# Patient Record
Sex: MALE | Race: BLACK OR AFRICAN AMERICAN | NOT HISPANIC OR LATINO | Employment: FULL TIME | URBAN - METROPOLITAN AREA
[De-identification: names, ages, dates, MRNs, and addresses within clinical notes are randomized per-mention and may not be internally consistent; named-entity substitution may affect disease eponyms.]

---
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
## 2021-04-13 DIAGNOSIS — Z23 ENCOUNTER FOR IMMUNIZATION: ICD-10-CM

## 2022-03-25 ENCOUNTER — APPOINTMENT (OUTPATIENT)
Dept: RADIOLOGY | Facility: CLINIC | Age: 22
End: 2022-03-25
Payer: COMMERCIAL

## 2022-03-25 ENCOUNTER — OFFICE VISIT (OUTPATIENT)
Dept: OBGYN CLINIC | Facility: CLINIC | Age: 22
End: 2022-03-25
Payer: COMMERCIAL

## 2022-03-25 VITALS
BODY MASS INDEX: 24.38 KG/M2 | HEART RATE: 62 BPM | DIASTOLIC BLOOD PRESSURE: 71 MMHG | SYSTOLIC BLOOD PRESSURE: 109 MMHG | HEIGHT: 74 IN | WEIGHT: 190 LBS

## 2022-03-25 DIAGNOSIS — M25.561 CHRONIC PAIN OF BOTH KNEES: ICD-10-CM

## 2022-03-25 DIAGNOSIS — M76.52 PATELLAR TENDINITIS OF BOTH KNEES: Primary | ICD-10-CM

## 2022-03-25 DIAGNOSIS — M25.562 CHRONIC PAIN OF BOTH KNEES: ICD-10-CM

## 2022-03-25 DIAGNOSIS — M76.51 PATELLAR TENDINITIS OF BOTH KNEES: Primary | ICD-10-CM

## 2022-03-25 DIAGNOSIS — G89.29 CHRONIC PAIN OF BOTH KNEES: ICD-10-CM

## 2022-03-25 PROCEDURE — 99203 OFFICE O/P NEW LOW 30 MIN: CPT | Performed by: FAMILY MEDICINE

## 2022-03-25 PROCEDURE — 73564 X-RAY EXAM KNEE 4 OR MORE: CPT

## 2022-03-25 NOTE — PROGRESS NOTES
Assessment/Plan:  Assessment/Plan   Diagnoses and all orders for this visit:    Patellar tendinitis of both knees  -     XR knee 4+ vw right injury; Future  -     XR knee 4+ vw left injury; Future  -     Patellar strap      49-year-old active male from [facility] with bilateral knee pain more than 1 year duration  Discussed with patient physical exam, radiographs, impression and plan  X-rays of the knees are unremarkable for osseous abnormality  Physical exam both knees noted for prominence of tibial tuberosity  Physical noted for tenderness of the tibial tuberosity and patellar tendon both knees  He has full range of motion and strength both knees  There is no appreciable collateral laxity  There is negative patellar inhibition and grind  Lavell's testing is unremarkable  There is groin pain on the left with ELLIE maneuver  He has normal sensation both lower extremities  Clinical impression is that he is symptomatic from patellar tendinitis  I discussed regimen of supplements, topical anti-inflammatory, and strapping  I provided him patellar tendon straps to be worn during physical activity  He is to start taking tumeric at least 1000 mg daily and tart cherry at least 1000 mg daily  He is to apply topical diclofenac gel 3 to 4 times a day consistently for the next 10 days  He will follow up as needed  Subjective:   Patient ID: Jenae Goss is a 25 y o  male  Chief Complaint   Patient presents with    Right Knee - Pain    Left Knee - Pain       49-year-old active male from [facility] presents for evaluation of bilateral knee pain more than 1 year duration  He denies any particular trauma or inciting event  Pain described as gradual in onset, localized to the anterior aspect knees at the proximal tibia, nonradiating, worse with running and jumping activities, and improved resting  Symptoms were initially occurring rarely however have become more frequent    He reports onset of symptoms shortly after initiating running jumping activity  He still continue participating in [employer] basketball games however experiences pain  He sometimes takes Tylenol and applies ice to help with symptoms  He denies buckling or instability or numbness or tingling  Knee Pain  This is a chronic problem  The current episode started more than 1 year ago  The problem occurs daily  The problem has been waxing and waning  Associated symptoms include arthralgias  Pertinent negatives include no abdominal pain, chest pain, chills, fever, joint swelling, numbness, rash, sore throat or weakness  Exacerbated by: Running, jumping  He has tried rest, acetaminophen and ice for the symptoms  The treatment provided mild relief  The following portions of the patient's history were reviewed and updated as appropriate: He  has a past medical history of Known health problems: none  He  has no past surgical history on file  His family history is not on file  He  reports that he has never smoked  He has never used smokeless tobacco  He reports current alcohol use  He reports that he does not use drugs  He has No Known Allergies       Review of Systems   Constitutional: Negative for chills and fever  HENT: Negative for sore throat  Eyes: Negative for visual disturbance  Respiratory: Negative for shortness of breath  Cardiovascular: Negative for chest pain  Gastrointestinal: Negative for abdominal pain  Genitourinary: Negative for flank pain  Musculoskeletal: Positive for arthralgias  Negative for joint swelling  Skin: Negative for rash and wound  Neurological: Negative for weakness and numbness  Hematological: Does not bruise/bleed easily  Psychiatric/Behavioral: Negative for self-injury         Objective:  Vitals:    03/25/22 1330   BP: 109/71   Pulse: 62   Weight: 86 2 kg (190 lb)   Height: 6' 2" (1 88 m)     Right Ankle Exam     Tenderness   The patient is experiencing no tenderness  Swelling: none    Range of Motion   Dorsiflexion: normal   Plantar flexion: normal     Muscle Strength   Dorsiflexion:  5/5  Plantar flexion:  5/5      Left Ankle Exam     Tenderness   The patient is experiencing no tenderness  Swelling: none    Range of Motion   Dorsiflexion: normal   Plantar flexion: normal     Muscle Strength   Dorsiflexion:  5/5   Plantar flexion:  5/5       Right Knee Exam     Muscle Strength   The patient has normal right knee strength  Tenderness   The patient is experiencing tenderness in the patellar tendon (Tibial tuberosity)  Range of Motion   The patient has normal right knee ROM  Tests   Lavell:  Medial - negative Lateral - negative  Varus: negative Valgus: negative    Other   Swelling: none    Comments:  Negative patellar inhibition and grind      Left Knee Exam     Tenderness   The patient is experiencing tenderness in the patellar tendon (Tibial tuberosity)  Range of Motion   The patient has normal left knee ROM  Tests   Lavell:  Medial - negative Lateral - negative  Varus: negative Valgus: negative    Other   Swelling: none    Comments:  Negative patellar inhibition and grind      Right Hip Exam     Muscle Strength   Flexion: 5/5     Tests   ELLIE: negative    Comments:  Negative FADDIR      Left Hip Exam     Muscle Strength   Flexion: 5/5     Tests   ELLIE: positive    Comments:  Negative FADDIR          Strength/Myotome Testing     Left Ankle/Foot   Dorsiflexion: 5  Plantar flexion: 5    Right Ankle/Foot   Dorsiflexion: 5  Plantar flexion: 5      Physical Exam  Vitals and nursing note reviewed  Constitutional:       General: He is not in acute distress  Appearance: He is well-developed  He is not ill-appearing or diaphoretic  HENT:      Head: Normocephalic and atraumatic        Right Ear: External ear normal       Left Ear: External ear normal    Eyes:      Conjunctiva/sclera: Conjunctivae normal    Neck:      Trachea: No tracheal deviation  Cardiovascular:      Rate and Rhythm: Normal rate  Pulmonary:      Effort: Pulmonary effort is normal  No respiratory distress  Abdominal:      General: There is no distension  Musculoskeletal:         General: Swelling and tenderness present  No deformity or signs of injury  Right knee:      Instability Tests: Medial Lavell test negative and lateral Lavell test negative  Left knee:      Instability Tests: Medial Lavell test negative and lateral Lavell test negative  Skin:     General: Skin is warm and dry  Coloration: Skin is not jaundiced or pale  Neurological:      Mental Status: He is alert and oriented to person, place, and time  Psychiatric:         Mood and Affect: Mood normal          Behavior: Behavior normal          Thought Content: Thought content normal          Judgment: Judgment normal          I have personally reviewed pertinent films in PACS and my interpretation is No osseous abnormality of the knees

## 2022-08-23 ENCOUNTER — OFFICE VISIT (OUTPATIENT)
Dept: OBGYN CLINIC | Facility: CLINIC | Age: 22
End: 2022-08-23
Payer: COMMERCIAL

## 2022-08-23 VITALS
DIASTOLIC BLOOD PRESSURE: 82 MMHG | BODY MASS INDEX: 23.74 KG/M2 | SYSTOLIC BLOOD PRESSURE: 145 MMHG | WEIGHT: 185 LBS | HEART RATE: 56 BPM | HEIGHT: 74 IN

## 2022-08-23 DIAGNOSIS — M23.91 INTERNAL DERANGEMENT OF RIGHT KNEE: ICD-10-CM

## 2022-08-23 DIAGNOSIS — M23.92 INTERNAL DERANGEMENT OF LEFT KNEE: Primary | ICD-10-CM

## 2022-08-23 PROCEDURE — 99213 OFFICE O/P EST LOW 20 MIN: CPT | Performed by: FAMILY MEDICINE

## 2022-08-23 RX ORDER — NAPROXEN 500 MG/1
500 TABLET ORAL 2 TIMES DAILY WITH MEALS
Qty: 30 TABLET | Refills: 0 | Status: SHIPPED | OUTPATIENT
Start: 2022-08-23

## 2022-08-23 RX ORDER — TACROLIMUS 0.3 MG/G
OINTMENT TOPICAL
COMMUNITY
Start: 2022-08-23

## 2022-08-23 RX ORDER — MOMETASONE FUROATE 1 MG/ML
SOLUTION TOPICAL
COMMUNITY
Start: 2022-08-23

## 2022-08-23 RX ORDER — KETOCONAZOLE 20 MG/ML
SHAMPOO TOPICAL
COMMUNITY
Start: 2022-08-23

## 2022-08-23 NOTE — PROGRESS NOTES
Assessment/Plan:  Assessment/Plan   Diagnoses and all orders for this visit:    Internal derangement of left knee  -     naproxen (NAPROSYN) 500 mg tablet; Take 1 tablet (500 mg total) by mouth 2 (two) times a day with meals  -     MRI knee left  wo contrast; Future    Internal derangement of right knee  -     naproxen (NAPROSYN) 500 mg tablet; Take 1 tablet (500 mg total) by mouth 2 (two) times a day with meals  -     MRI knee right  wo contrast; Future    Other orders  -     ketoconazole (NIZORAL) 2 % shampoo  -     mometasone (ELOCON) 0 1 % lotion  -     tacrolimus (PROTOPIC) 0 03 % ointment      20-year-old male from [facility] with bilateral knee pain, left worse than right more than 18 months duration  Discussed with patient physical exam, imaging studies, impression and plan  X-rays of the knees are unremarkable for osseous abnormality  Physical exam noted for prominence/swelling at the tibial tuberosity both knees  He has tenderness at the patellar tendon and patellar facet both knees  He has full extension and flexion to 130° both knees  There is no appreciable collateral ligament laxity  There is positive patellar inhibition and grind both knees  Clinical impression is that he may be symptomatic from cartilage irregularity in the knees  He has more than 18 months since onset of symptoms and still symptomatic despite conservative management of more than 3 months of applying topical diclofenac gel daily, icing, wearing patellar tendon strap, and home exercise program focused on knee stretching and strengthening  At this time I will refer him for MRI of the left knee and MRI of the right knee to evaluate for meniscus tear and cartilage defect as invasive management/surgical intervention may be warranted  He will follow up after getting MRIs done  Subjective:   Patient ID: Jenae Goss is a 25 y o  male    Chief Complaint   Patient presents with    Left Knee - Pain    Right Knee - Pain       66-year-old male from [facility] following up for bilateral knee pain, left worse than right more than 18 months duration  He was last seen by me 5 months ago which point he was advised on topical diclofenac gel, supplements, and patellar tendon straps  He has also been doing home exercise program with focus on knee stretching and strengthening on daily basis  He reports having pain described as localized to the anterior aspect knees, nonradiating, worse with physical activity, associated with clicking, and improved resting  He has been applying topical diclofenac gel and taking Tylenol to help with symptoms  Knee Pain  This is a chronic problem  The current episode started more than 1 year ago  The problem occurs daily  The problem has been unchanged  Associated symptoms include arthralgias and joint swelling  Pertinent negatives include no numbness or weakness  Exacerbated by: Physical activity  He has tried rest, NSAIDs, ice and position changes (Exercise therapy, knee strap) for the symptoms  The treatment provided mild relief  Review of Systems   Musculoskeletal: Positive for arthralgias and joint swelling  Neurological: Negative for weakness and numbness  Objective:  Vitals:    08/23/22 1635   BP: 145/82   Pulse: 56   Weight: 83 9 kg (185 lb)   Height: 6' 2" (1 88 m)     Right Ankle Exam     Muscle Strength   Dorsiflexion:  5/5  Plantar flexion:  5/5      Left Ankle Exam     Muscle Strength   Dorsiflexion:  5/5   Plantar flexion:  5/5       Right Knee Exam     Muscle Strength   The patient has normal right knee strength  Tenderness   The patient is experiencing tenderness in the patellar tendon (Patellar facet)      Range of Motion   Extension: normal   Flexion: 130     Tests   Lavell:  Medial - negative Lateral - negative  Varus: negative Valgus: negative    Other   Swelling: mild    Comments:  Positive patellar inhibition and grind      Left Knee Exam Muscle Strength   The patient has normal left knee strength  Tenderness   The patient is experiencing tenderness in the patellar tendon (Patellar facet)  Range of Motion   Extension: normal   Flexion: 130     Tests   Lavell:  Medial - negative Lateral - negative  Varus: negative Valgus: negative    Other   Swelling: mild    Comments:  Positive patellar inhibition and grind          Strength/Myotome Testing     Left Ankle/Foot   Dorsiflexion: 5  Plantar flexion: 5    Right Ankle/Foot   Dorsiflexion: 5  Plantar flexion: 5      Physical Exam  Vitals and nursing note reviewed  Constitutional:       General: He is not in acute distress  Appearance: He is well-developed  He is not ill-appearing or diaphoretic  HENT:      Head: Normocephalic and atraumatic  Right Ear: External ear normal       Left Ear: External ear normal    Eyes:      Conjunctiva/sclera: Conjunctivae normal    Neck:      Trachea: No tracheal deviation  Cardiovascular:      Rate and Rhythm: Normal rate  Pulmonary:      Effort: Pulmonary effort is normal  No respiratory distress  Abdominal:      General: There is no distension  Musculoskeletal:         General: Tenderness present  No swelling, deformity or signs of injury  Right knee:      Instability Tests: Medial Lavell test negative and lateral Lavell test negative  Left knee:      Instability Tests: Medial Lavell test negative and lateral Lavell test negative  Skin:     General: Skin is warm and dry  Coloration: Skin is not jaundiced or pale  Neurological:      Mental Status: He is alert and oriented to person, place, and time  Psychiatric:         Mood and Affect: Mood normal          Behavior: Behavior normal          Thought Content:  Thought content normal          Judgment: Judgment normal

## 2022-08-25 ENCOUNTER — HOSPITAL ENCOUNTER (OUTPATIENT)
Dept: MRI IMAGING | Facility: HOSPITAL | Age: 22
End: 2022-08-25
Payer: COMMERCIAL

## 2022-08-25 DIAGNOSIS — M23.91 INTERNAL DERANGEMENT OF RIGHT KNEE: ICD-10-CM

## 2022-08-25 DIAGNOSIS — M23.92 INTERNAL DERANGEMENT OF LEFT KNEE: ICD-10-CM

## 2022-08-25 PROCEDURE — G1004 CDSM NDSC: HCPCS

## 2022-08-25 PROCEDURE — 73721 MRI JNT OF LWR EXTRE W/O DYE: CPT

## 2022-08-26 ENCOUNTER — OFFICE VISIT (OUTPATIENT)
Dept: OBGYN CLINIC | Facility: CLINIC | Age: 22
End: 2022-08-26
Payer: COMMERCIAL

## 2022-08-26 VITALS
SYSTOLIC BLOOD PRESSURE: 130 MMHG | HEART RATE: 64 BPM | BODY MASS INDEX: 24.38 KG/M2 | WEIGHT: 190 LBS | DIASTOLIC BLOOD PRESSURE: 85 MMHG | HEIGHT: 74 IN

## 2022-08-26 DIAGNOSIS — M22.2X1 PATELLOFEMORAL SYNDROME OF BOTH KNEES: Primary | ICD-10-CM

## 2022-08-26 DIAGNOSIS — M76.50 PATELLAR TENDINOSIS: ICD-10-CM

## 2022-08-26 DIAGNOSIS — M22.2X2 PATELLOFEMORAL SYNDROME OF BOTH KNEES: Primary | ICD-10-CM

## 2022-08-26 DIAGNOSIS — R29.898 WEAKNESS OF BOTH HIPS: ICD-10-CM

## 2022-08-26 DIAGNOSIS — Q68.2 PATELLA ALTA: ICD-10-CM

## 2022-08-26 DIAGNOSIS — M62.9 HAMSTRING TIGHTNESS OF BOTH LOWER EXTREMITIES: ICD-10-CM

## 2022-08-26 DIAGNOSIS — R19.8 ABDOMINAL WEAKNESS: ICD-10-CM

## 2022-08-26 PROCEDURE — 99213 OFFICE O/P EST LOW 20 MIN: CPT | Performed by: FAMILY MEDICINE

## 2022-08-26 NOTE — PROGRESS NOTES
Assessment/Plan:  Assessment/Plan   Diagnoses and all orders for this visit:    Patellofemoral syndrome of both knees  -     Brace  -     Ambulatory Referral to Physical Therapy; Future    Patella brenden  -     Ambulatory Referral to Physical Therapy; Future    Weakness of both hips  -     Ambulatory Referral to Physical Therapy; Future    Hamstring tightness of both lower extremities  -     Ambulatory Referral to Physical Therapy; Future    Abdominal weakness  -     Ambulatory Referral to Physical Therapy; Future    Patellar tendinosis  -     Ambulatory Referral to Physical Therapy; Future      26-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 [facility] with bilateral knee pain more than 18 months duration  Discussed with patient MRI results, impression and plan  MRI both knees noted for findings suggestive of patellar maltracking, patellar tendinosis, and mild patella Brenden  Clinical impression is that he is symptomatic from abnormal patellofemoral mechanics  I discussed regimen of anti-inflammatory, bracing, and formal therapy  Invasive management/surgery is not warranted  He may continue with naproxen 500 mg twice daily  I provided him with patellar stabilizing braces to wear during physical activity  He is to start physical therapy as soon as possible and do home exercises as directed  He will follow up as needed  Subjective:   Patient ID: Juan Manuel Stephenson is a 25 y o  male  Chief Complaint   Patient presents with    Left Knee - Follow-up, Pain    Right Knee - Follow-up, Pain       26-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 [facility] following up for bilateral knee pain of more than 18 months duration  He was last seen by me 3 days ago at which point he was referred for MRI of the knees  He was prescribed naproxen 500 mg twice daily    He reports having pain described as generalized to the knees but worse at the anterior aspects, nonradiating, worse with physical activity particularly jumping, associated with clicking, and improved with resting  Knee Pain  This is a chronic problem  The current episode started more than 1 year ago  The problem occurs daily  The problem has been waxing and waning  Associated symptoms include arthralgias  Pertinent negatives include no joint swelling, numbness or weakness  Exacerbated by: Physical activity  He has tried rest, NSAIDs, ice and position changes (Patellar strap, exercise therapy) for the symptoms  The treatment provided mild relief  Review of Systems   Musculoskeletal: Positive for arthralgias  Negative for joint swelling  Neurological: Negative for weakness and numbness  Objective:  Vitals:    08/26/22 1335   BP: 130/85   Pulse: 64   Weight: 86 2 kg (190 lb)   Height: 6' 2" (1 88 m)     Ortho Exam    Physical Exam  Vitals and nursing note reviewed  Constitutional:       General: He is not in acute distress  Appearance: He is well-developed  He is not ill-appearing or diaphoretic  HENT:      Head: Normocephalic and atraumatic  Right Ear: External ear normal       Left Ear: External ear normal    Eyes:      Conjunctiva/sclera: Conjunctivae normal    Neck:      Trachea: No tracheal deviation  Cardiovascular:      Rate and Rhythm: Normal rate  Pulmonary:      Effort: Pulmonary effort is normal  No respiratory distress  Abdominal:      General: There is no distension  Skin:     General: Skin is warm and dry  Coloration: Skin is not jaundiced or pale  Neurological:      Mental Status: He is alert and oriented to person, place, and time  Psychiatric:         Mood and Affect: Mood normal          Behavior: Behavior normal          Thought Content: Thought content normal          Judgment: Judgment normal          I have personally reviewed pertinent films in PACS and my interpretation is No appreciable ACL or meniscus pathology